# Patient Record
Sex: MALE | Race: WHITE | NOT HISPANIC OR LATINO | Employment: FULL TIME | URBAN - METROPOLITAN AREA
[De-identification: names, ages, dates, MRNs, and addresses within clinical notes are randomized per-mention and may not be internally consistent; named-entity substitution may affect disease eponyms.]

---

## 2023-01-26 ENCOUNTER — TELEPHONE (OUTPATIENT)
Dept: PSYCHIATRY | Facility: CLINIC | Age: 33
End: 2023-01-26

## 2023-01-26 NOTE — TELEPHONE ENCOUNTER
PSYCHIATRY CLINIC PHONE INTAKE     SERVICES REQUESTED / INTERESTED IN          Other:  AMBROSET     Presenting Problem and Brief History                              What would you like to be seen for? (brief description):  Clinic received an external referral from patient's therapist, Nancy Carirllo.  Patient states that they are looking for help with adjustment disorder with anxiety/depression/feelings of guilt.  Patient was looking into IOP but insurance does not cover it.      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  Patient is seeing a therapist for individual therapist (Nancy Carrillo at [facility]) and a marriage counselor ( Yasmeen Velasco at [facility]).  Patient and his wife are currently living apart    When asked why he wanted to see a provider here, patient stated that there's been a lot of confusion of patient's wants and needs as an individual. He has been told he has an adjustment disorder due to childhood issues which also includes current anxiety and depression.  Patient reports that sleeping and concentration are also concerns.      Have you received a mental health diagnosis? Yes   Which one (s): Depression, Anxiety  Is there any history of developmental delay?  No   Are you currently seeing a mental health provider?  Yes            Who / month last seen:  Individual therapy - Nancy Carrillo at [facility], Marriage Counselor - Yasmeen Velasco at [facility]  Do you have mental health records elsewhere?  Yes  Will you sign a release so we can obtain them?  Yes    Have you ever been hospitalized for psychiatric reasons?  No  Describe:  NA    Do you have current thoughts of self-harm?  No    Do you currently have thoughts of harming others?  No    Do you have any safety concerns? No   If yes to these, offer to reach out to a  for follow up.      Substance Use History      Do you have any history of alcohol / illicit drug use?  No  Describe:  NA  Have you ever received treatment for this?  No    Describe:  NA     Social History     Who is the patient's a guardian?  Yes    Name / number: NA  Have you had an ACT team in last 12 months?  No  Describe: NA   OK to leave a detailed voicemail?  Yes    Would you be interested in learning more about research opportunities for which you or your child may qualify? We can connect you with a team member for more information.  NA  If yes, send an "[employer] message to Kasie Abraham    Medical/ Surgical History                                 There is no problem list on file for this patient.         Medications             No current outpatient medications on file.         DISPOSITION      Intake phone screen completed on 1/26/23.  [employer] invitation sent via email.  New patient paperwork sent via email.  ALEJANDRO GUIDO scheduled with Claudine Bullard on 2/15/23 and RABT med eval scheduled with Tom Mccann on 3/2/23.    Jenn Milian -

## 2023-02-15 ENCOUNTER — VIRTUAL VISIT (OUTPATIENT)
Dept: PSYCHIATRY | Facility: CLINIC | Age: 33
End: 2023-02-15
Attending: SOCIAL WORKER
Payer: OTHER GOVERNMENT

## 2023-02-15 DIAGNOSIS — F33.1 MAJOR DEPRESSIVE DISORDER, RECURRENT EPISODE, MODERATE (H): Primary | ICD-10-CM

## 2023-02-15 PROBLEM — H52.13 MYOPIA, BILATERAL: Status: ACTIVE | Noted: 2023-02-15

## 2023-02-15 PROBLEM — H52.223 REGULAR ASTIGMATISM, BILATERAL: Status: ACTIVE | Noted: 2023-02-15

## 2023-02-15 PROCEDURE — 90791 PSYCH DIAGNOSTIC EVALUATION: CPT | Mod: VID

## 2023-02-15 ASSESSMENT — PATIENT HEALTH QUESTIONNAIRE - PHQ9
SUM OF ALL RESPONSES TO PHQ QUESTIONS 1-9: 15
10. IF YOU CHECKED OFF ANY PROBLEMS, HOW DIFFICULT HAVE THESE PROBLEMS MADE IT FOR YOU TO DO YOUR WORK, TAKE CARE OF THINGS AT HOME, OR GET ALONG WITH OTHER PEOPLE: VERY DIFFICULT
SUM OF ALL RESPONSES TO PHQ QUESTIONS 1-9: 15

## 2023-02-15 ASSESSMENT — ANXIETY QUESTIONNAIRES
7. FEELING AFRAID AS IF SOMETHING AWFUL MIGHT HAPPEN: NOT AT ALL
4. TROUBLE RELAXING: MORE THAN HALF THE DAYS
7. FEELING AFRAID AS IF SOMETHING AWFUL MIGHT HAPPEN: NOT AT ALL
8. IF YOU CHECKED OFF ANY PROBLEMS, HOW DIFFICULT HAVE THESE MADE IT FOR YOU TO DO YOUR WORK, TAKE CARE OF THINGS AT HOME, OR GET ALONG WITH OTHER PEOPLE?: VERY DIFFICULT
3. WORRYING TOO MUCH ABOUT DIFFERENT THINGS: MORE THAN HALF THE DAYS
GAD7 TOTAL SCORE: 11
IF YOU CHECKED OFF ANY PROBLEMS ON THIS QUESTIONNAIRE, HOW DIFFICULT HAVE THESE PROBLEMS MADE IT FOR YOU TO DO YOUR WORK, TAKE CARE OF THINGS AT HOME, OR GET ALONG WITH OTHER PEOPLE: VERY DIFFICULT
GAD7 TOTAL SCORE: 11
5. BEING SO RESTLESS THAT IT IS HARD TO SIT STILL: NOT AT ALL
6. BECOMING EASILY ANNOYED OR IRRITABLE: NEARLY EVERY DAY
2. NOT BEING ABLE TO STOP OR CONTROL WORRYING: MORE THAN HALF THE DAYS
1. FEELING NERVOUS, ANXIOUS, OR ON EDGE: MORE THAN HALF THE DAYS
GAD7 TOTAL SCORE: 11

## 2023-02-15 NOTE — PROGRESS NOTES
General Evaluation   Diagnostic Assessment  [facility]    Shawn Luna MRN# [id number]   Age: 32 year old YOB: 1990     Date of Evaluation: 2/15/23  60 minute evaluation    Type of service: Telehealth Individual Psychotherapy  Reason for Telemedicine Visit: COVID-19 public health recommendations on in-person sessions  Mode of transmission: Secure real time interactive audio and visual telecommunication system (videoconference via [employer])  Location of originating and distant sites:  ? Originating site (patient location): patient home  ? Distant site (provider site): HIPAA compliant location within provider home/remote setting  Telemedicine Visit: The patient's condition can be safely assessed and treated via synchronous audio and visual telemedicine encounter.    Patient has agreed to receiving services via telemedicine technology.    Contributors to the Assessment   Chart Reviewed.   Interview completed with Shawn Luna.  Multiple attempts made to receive releases of information for current therapist.    Chief Complaint   Alfonzo feels that he engages in dissociation and compartmentalization, which does not allow him to deal with his concerns in a healthy way. Alfonzo has significant relationship concerns with his wife and experiences anxiety regarding this. He additionally reports depressive symptoms including feeling down and depressed and having a lack of interest in things he used to enjoy.     History of Present Illness    Shawn Luna is a 32 year old male who prefers the name Alfonzo & pronouns he/him.  Alfonzo presents for evaluation for mental health symptoms.  Patient attended the session alone.  Discussed limits of confidentiality today and status as a mandated .     Per patient's report:   Alfonzo reports that his depression likely started in his teenage years and has continued to the present. He identifies having times that it has lessened or  "that he has been more distracted. Alfonzo reports feeling a lack of autonomy in his life, and feeling as though life happens to him. Alfonzo identified having trouble owning the parts that he plays in his life circumstances. He reports having a lack of hope that happy things will happen in his life and feels loneliness.     Alfonzo identified feeling a constant sense of anxiety when it comes to his relationship with his spouse. Alfonzo explains his relationship concerns with his wife stemming from fundamental differences in their relationship in addition to infidelity which he engaged in from earlier on in their relationship. Alfonzo and his wife are not currently living together. There is a lack of trust present in their relationship and his wife has access to all of his texts/phone calls/social media accounts. Alfonzo reports being discouraged to reach out to family or friends to talk about his relationship with his wife, due to her fearing that he will paint her in a bad light. Alfonzo wants to figure out answers and best next steps related to his relationship. He identifies wanting to make sure he has done everything he can in his relationship. Alfonzo's anxiety symptoms stem from his relationship and he identifies experiencing the following symptoms; hard to control his worries, muscle tension, feeling tired, difficulty concentrating and feeling irritable. Alfonzo denies being a \"worrier\" or feeling worried about other things.     Alfonzo is currently in the [employer]. He reports being able to compartmentalize everything going on in his personal life when at work. He did identify that his last deployment was more intense and that work can be a stressor. Overall, Alfonzo reports feeling very overwhelmed with everything going on in his life.     The patient reports no lifetime psychiatric hospitalizations.     Patient reported hoped for outcomes of our assessment as wanting to find better ways of dealing with his feelings. He identified wanting to find healthy " "coping mechanisms that he he actually buys into. He also wants to gain self confidence and self awareness.     Psychiatric Review of Systems (Completed M.I.N.I. Version 7.0.2: Yes)   A. DEPRESSION  Past 2 Weeks:  low mood nearly every day, less interested and able to do things previously enjoyed, tired and without energy, feeling guilty, difficulty concentrating and making decisions  Past Episode:  Same symptoms as past 2 weeks.   PHQ-9 scores: No flowsheet data found.   PHQ-9 score today, 2/15/23: 15    B. SUICIDALITY: Current: No, risk Low  -reports 0% in response to \"How likely are to you to try to kill yourself within the next 3 months on a scale from 0-100%?\"   -denies current SI, denies intent and plan  -denies current SIB/Self Injurious Behavior  -denies current HI    C. JARED/HYPOMANIA  Current Episode:  none  Past Episode:  none  MDQ Score: Negative Screen    D. PANIC:  none Reports having one panic attack aprox. 10 years ago.     E. AGORAPHOBIA:  none    F. SOCIAL ANXIETY:  none    G. OBSESSIVE-COMPULSIVE:  none    H. TRAUMA:  none    I. ALCOHOL & J. NON-ALCOHOL:  See below    K. PSYCHOSIS:   none    L-M. EATING DISORDER: none    N. GENERALIZED ANXIETY:  with difficulty controlling worry, feeling restless or keyed up, muscle tension, difficulty concentrating, feeling irritable. Of note, Alfonzo denies feeling worried about several routine things, or feeling that he is a \"worrier\". Alfonzo reports experiencing his symptoms of anxiety in direct relation to his concerns related to his spouse.     CLARE-7 score today, 2/15/23:  11 out of 21, indicating moderate anxiety.    O. RULE OUT MEDICAL, ORGANIC OR DRUG CAUSES FOR ALL DISORDERS  During any current disorder or past mood episode, patient reports:  A. Substance use or withdrawal: No  B. Medical illness: No    P. ANTISOCIAL PERSONALITY:  none   Other Cluster B Traits:  none discussed    Other symptoms not assessed with M.I.N.I.  ADD / ADHD: No symptoms  Gambling or " shoplifting: No   Sleep:   Alfonzo reports staying up to late and then not getting enough sleep. When asked more about this, he explained feeling that he absorbs as much distraction as he can (TV, youtube, etc.) which ends up keeping him awake for too long.     PROMIS-10 Scores              Past Psychiatric History   Past diagnoses: Adjustment disorder     Past medication trials: None. Alfonzo reports being open to a conversation about medications, but does not believe this is something he necessarily needs or is looking for.     GeneSight Genetic Testing: No     Hospitalizations and dates: None   Commitment: No, Current Grady order: No   Electroconvulsive Therapy (ECT) or Transcranial Magnetic Stimulation (TMS): No    Suicide attempts: No  Self-injurious behavior: Denies  Violent or aggressive behavior towards others or property: No    Outpatient Programs & Services:   Psychotherapy:  Has an individual therapist and sees a couples therapist  Medication Mgmt: None   Case Mgmt:  None   Assessments or psychological testing: None  Past Treatment: counseling     Substance Use History:    Alcohol- yes. Alfonzo reports historically drinking 3-4 drinks about 5 days a week. He states, that he has very recently (last week) has been trying to cut down and drank 2 drinks a couple of times a week.   Alfonzo estimates that he has between 150mg - 300mg of caffeine a day. He additionally estimates having 32mg-40mg of nicotine a day. He reports using pouches, which contain 4mg of nicotine a pouch and has between 8 and 10 of these a day.   Patient reported the following problems as a result of drinking: worsened psychological or physical problems .   Based on the clinical interview, there  are indications of drug or alcohol abuse. .  Alfonzo reports drinking more than he originally planned, repeatedly wanting to reduce his alcohol use but failing, craving alcohol, and drinking more in order to get the same effect that he did when he first started  "drinking. Continue to monitor.   Discussed effect of substance use on overall health and how this may contribute to their mental health symptoms.     CD treatment hx: Patient has not received chemical dependency treatment in the past    [facility]AGE-[facility] was completed today, 2/15/23  C     Patient felt they ought to CUT down on your drinking (or drug use).         Social History:    Living situation: Alfonzo is currently living with a family friend. Alfonzo and his wife have been phsycially seperated in terms of their living situation 12 times in the last few years, most recently they have been living apart for the past 3 months.      Relationships: Significant relationships include his parents, wife and several good friends. Alfonzo is the youngest of four children and reports getting away with a lot while growing up. He reports that his parents showed him a lack of emotional vulnerability and he reports \"just doing [his] own thing\". Alfonzo identifies seeking out affection from women in his teenage years due to not receiving this from home. Alfonzo identified engaging in promiscuity and risky behaviors as a teenager which was ignored by his parents. As noted above, Alfonzo's relationship with his wife, who he's been  to for four years, is strained. He identifies the origin of this coming from his infidelity. He also reported that his wife has a diagnosis of borderline personality disorder, he noted this mostly showing up in \"verbal outbursts\" and that afterwards she has trouble remembering what was said. There is a lack of trust in their relationship which has created an environment where his wife has access to and regularly checks all communication he is engaging in. Alfonzo reports that his parents and wife have a strained relationship due to his wife believing that they do not like her. Alfonzo has meaningful friends in his life, who he reports likely being the most healthy relationships he has. He also has co-workers that he has good " relationships with and takes pride in holding a mentorship role at work.     Education: Alfonzo is currently attending school. He will be taking his final class this summer for his masters in buisness    Occupation: Pt reports he is employed full-time. Alfonzo is in the [employer]. He is active duty but also working in [employer].  Occupational goals include serving another 13 years in the [employer] and earning his pension.      Finances: Alfonzo  is financial supported by [employer].     Spiritual considerations: Patient identifies with ro community.  Describes their Sikh as [employer].     Cultural influences: Alfonzo describes their race as White. Alfonzo's primary spoken language is English. Alfonzo identifies their sexual orientation as heterosexual, and their gender as he.  Alfonzo prefers male pronouns. Denies any other cultural influences.     Strengths & Opportunities:  Hobbies and enjoyable activities include video games, golf, and lifting weights.  Exercise and nutrition habits include No regular exercise program and No Restrictions diet. Alfonzo reports that over the last 3 to 4 months that he has not consistently been working out or eating healthy. Alfonzo identifies working out especially, as being important to him and something he usually enjoys, noting that his lack of motivation for doing so is substantial.  Self identified strengths are wants to learn and goal focused.  Coping mechanisms include Therapy, friends, exercise and alcohol.      Legal Hx: No: Patient denies any legal history     Trauma and/or Abuse Hx: No identified issues. Alfonzo identifies that his work can be very high stress and high intensity, though he's never felt that his life has directly been in threat. High stress and high anxiety should be considered when thinking about treatment.      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        Developmental History:   Alfonzo was born without pregnancy or delivery complications.   Alfonzo denies in utero substance  exposure. Alfonzo  did not meet developmental milestones on time. Alfonzo did not receive interventions for developmental delays. Alfonzo  did not require an IEP during school.          Family History:   Mental Illness History: Yes: Alfonzo believes that both his mother and brother take medications for depression. Alfonzo's brother also had anorexia     Substance Abuse History: Denies  Alfonzo reports that his dad is likely trending toward an unhealthy relationship with alcohol, but that this was a recent development.   Medical conditions: Yes: Dad has high blood pressure, Mom had breast cancer, maternal grandmother  of 3 forms of cancer    denies history of completed suicides.         Past Medical History:    Primary Care Physician: No primary care provider on file.      History of seizures or head trauma/loss of consciousness? No   There is no problem list on file for this patient.       Medical problems: No  Surgical history: Yes - Had foot surgery at 22        Allergies:    Not on File       Medications:     No current outpatient medications on file.       Most Recent Labs & Vitals (per EPIC):   There were no vitals taken for this visit.    No lab results found.  No lab results found.  Mental Status Exam   Alertness: alert   Attention Span and Concentration:  Normal  Attitude:  cooperative   Appearance: appeared stated age  Behavior/Demeanor: cooperative, with good eye contact   Speech: regular rate and rhythm  Language: intact. Preferred language identified as English.  Psychomotor Behavior:  no evidence of tardive dyskinesia, dystonia, or tics  Mood: worried  Affect: mood congruent  Associations:  no loose associations  Thought Process:  linear  Thought Content:  no evidence of suicidal ideation or homicidal ideation  Perception:  Reports none;  Denies none  Insight: good  Judgment: excellent  Impulse Control:  intact  Cognition: does  appear grossly intact; formal cognitive testing was not done    Safety: Without the recommended  intervention, Alfonzo could experience possible increase in psychotic symptoms requiring hospitalization. In addition, there are notable risk factors for self-harm, including hopelessness. However, risk is mitigated by commitment to family. Therefore, based on all available evidence including the factors cited above, Alfonzo does not appear to be at imminent risk for self-harm, does not meet criteria for a 72-hr hold, and therefore remains appropriate for ongoing outpatient level of care.  Suicidality risk appeared Low.  The patient convincingly denies suicidality on several occasions. There was no deceit detected, and the patient presented in a manner that was believable.    Safety plan was discussed and included review of crisis phone numbers. Recommended that patient call 911 or go to the local ED should there be a change in any of these risk factors..   CRISIS NUMBERS Emphasized:  [facility] [phone] (clinic)    [phone] (after hours)  National Suicide Prevention Lifeline: 8-075-719-DNOY (707-338-3078)  [employer]/resources for a list of additional resources (SOS)            [facility] - [phone]   Urgent Care Adult Mental Sbmhml-[phone] mobile unit/ 24/7 crisis line  [facility] -[phone]   COPE 24/7 [place] Mobile Team -[phone] (adults)/ [phone] (child)  Poison Control Center - [phone]    OR  go to nearest ER  Crisis Text Line for any crisis 24/7 send this-   To: [phone]   [facility] ([facility]  [phone]    Provisional Psychiatric Diagnoses    296.32 (F33.1) Major Depressive Disorder, Recurrent Episode, Moderate  Rule out: Alcohol Use Disorder     Additionally: V61.10 Relationship distress with spouse or intimate partner causing clinically significant anxiety symptoms      Assessment   Shawn Elder Jeremy is a 32 year old  White Not  or  male with psychiatric history of Adjustment  Disorder with mixed anxiety and depressed mood who presented for a comprehensive assessment of mental health symptoms.  Alfonzo was referred by his therapist. Alfonzo  presented today as a a good historian.  Alfonzo has Good insight in their current circumstances. Mental health symptoms seem to have been present since adolecents, and included low mood, less interested and able to do things previously enjoyed, tired and without energy, feeling guilty, difficulty concentrating and making decisions. Alfonzo is additionally experiencing anxiety related to his relationship with his spouse. These symptoms include difficulty controlling worries, feeling restless or keyed up, muscle tension, difficulty concentrating, feeling irritable. Diagnosis of Major Depressive Disorder seems supported by patient report, collateral records, and the MINI 7.0.2. Relationship distress with spouse or intimate partner should also be considered when engaging in treatment due to the clinically significant anxiety symptoms that Alfonzo is experiencing.  Differential diagnosis of alcohol use disorder.  Though Alfonzo likely meets criteria for alcohol use disorder, it is also important to consider how his environment/culture of being in the [employer] may play a part in his alcohol use. More information and examination of alcohol use and its effects on Alfonzo is needed. Further diagnostic clarification is not needed.  There are no medical comorbidities which impact this treatment.    Psychosocial factors impacting treatment include Relationship Difficulties. Psychosocial stressors were identified as relationship stress. Alfonzo  has evidence of functional impairment including difficulty with home-family. More specifically, Alfonzo's symptoms are causing significant distress in multiple facets of Alfonzo's life including at home, work and in relationships.  Goal is to increase their functioning detailed above and assist Alfonzo to make progress towards their goals.  Alfonzo identified the following  "factors that will help them succeed in recovery include friends / good social support. Things that may interfere with their success include lacks coping skills.    Alfonzo is currently engaged in services in the community; Alfonzo sees an individual therapist and couples therapist with his wife.     Alfonzo agrees to treatment with the capacity to do so. Agrees to call clinic for any problems. The patient understands to call 911 or come to the nearest ED if life threatening or urgent symptoms present.    Billing for \"Interactive Complexity\"?    No    Plan   Psychotherapy: Alfonzo was interested in meeting with a therapist. Alfonzo would benefit from Supportive therapy.   Referral information was not sent to intake team for further coordination. Alfonzo is currently seeing a individual therapist and couples therapist. He is interested in engaging in more therapy and /or more intensive care such as an intensive outpatient program. SW is waiting for ROIs to be signed by Alfonzo for his current therapist in order to consult and make a decision on best next steps. Alfonzo additionally reports being on the waiting list for [facility].     Medication Management: Alfonzo is in need of Medication Management, and will have an evaluation with Dr. Mccann following this visit.     Case Management: Alfonzo is not followed by a .  Case Management is not an identified need at this time.     Claudine Bullard  Answers for HPI/ROS submitted by the patient on 2/15/2023  If you checked off any problems, how difficult have these problems made it for you to do your work, take care of things at home, or get along with other people?: Very difficult  PHQ9 TOTAL SCORE: 15  CLARE 7 TOTAL SCORE: 11      "

## 2023-02-16 ENCOUNTER — TELEPHONE (OUTPATIENT)
Dept: PSYCHIATRY | Facility: CLINIC | Age: 33
End: 2023-02-16
Payer: OTHER GOVERNMENT

## 2023-02-16 NOTE — TELEPHONE ENCOUNTER
Called Alfonzo, to follow up about getting ROIs signed for his current therapist. Alfonzo found the email in his spam inbox and plans to fill these out and send them back. Writer explained that the team was trying to brainstorm what would be best for his care moving forward and what insurance would cover. Writer will attempt to reach Alfonzo's current therapist next week and discuss best course of action moving forward. Writer will follow up with Alfonzo next week.

## 2024-10-11 ENCOUNTER — HOSPITAL ENCOUNTER (OUTPATIENT)
Dept: PET IMAGING | Facility: HOSPITAL | Age: 34
Discharge: HOME | End: 2024-10-11
Payer: OTHER GOVERNMENT

## 2024-10-11 DIAGNOSIS — R63.4 ABNORMAL WEIGHT LOSS: ICD-10-CM

## 2024-10-11 LAB — GLUCOSE BLDC GLUCOMTR-MCNC: 93 MG/DL (ref 70–99)

## 2024-10-11 PROCEDURE — 343N000001 HC RX 343 MED OP 636

## 2024-10-11 PROCEDURE — 78815 PET IMAGE W/CT SKULL-THIGH: CPT | Mod: PI

## 2024-10-11 PROCEDURE — 82962 GLUCOSE BLOOD TEST: CPT

## 2024-10-11 PROCEDURE — A9552 F18 FDG: HCPCS

## 2024-10-11 RX ORDER — FLUDEOXYGLUCOSE F 18 200 MCI/ML
7-18 INJECTION, SOLUTION INTRAVENOUS ONCE
Status: COMPLETED | OUTPATIENT
Start: 2024-10-11 | End: 2024-10-11

## 2024-10-11 RX ADMIN — FLUDEOXYGLUCOSE F 18 10.2 MILLICURIE: 200 INJECTION, SOLUTION INTRAVENOUS at 09:10

## 2024-12-15 ENCOUNTER — HEALTH MAINTENANCE LETTER (OUTPATIENT)
Age: 34
End: 2024-12-15